# Patient Record
Sex: FEMALE | Race: WHITE
[De-identification: names, ages, dates, MRNs, and addresses within clinical notes are randomized per-mention and may not be internally consistent; named-entity substitution may affect disease eponyms.]

---

## 2018-05-10 ENCOUNTER — HOSPITAL ENCOUNTER (OUTPATIENT)
Dept: HOSPITAL 80 - FSGY | Age: 23
Discharge: HOME | End: 2018-05-10
Attending: PLASTIC SURGERY
Payer: COMMERCIAL

## 2018-05-10 VITALS — DIASTOLIC BLOOD PRESSURE: 77 MMHG | SYSTOLIC BLOOD PRESSURE: 121 MMHG

## 2018-05-10 DIAGNOSIS — M25.511: ICD-10-CM

## 2018-05-10 DIAGNOSIS — M54.9: ICD-10-CM

## 2018-05-10 DIAGNOSIS — M25.512: ICD-10-CM

## 2018-05-10 DIAGNOSIS — N62: Primary | ICD-10-CM

## 2018-05-10 DIAGNOSIS — M54.2: ICD-10-CM

## 2018-05-10 PROCEDURE — 0HBV0ZZ EXCISION OF BILATERAL BREAST, OPEN APPROACH: ICD-10-PCS | Performed by: PLASTIC SURGERY

## 2018-05-10 NOTE — POSTOPPROG
Post Op Note


Date of Operation: 05/10/18


Surgeon: Estevan Huang


Anesthesiologist: radha


Anesthesia: GET(General Endotracheal)


Pre-op Diagnosis: Breast Hypertrophy


Post-op Diagnosis: Same


Procedure: Bilateral breast reduction


Inf/Abcess present in the surg proc area at time of surgery?: No


EBL: 


Drains: Hemovac

## 2018-05-10 NOTE — GOP
[f 
rep st]



                                                                OPERATIVE REPORT





DATE OF OPERATION:  05/10/2018



SURGEON:  Estevan Huang MD



PREOPERATIVE DIAGNOSIS:  Breast hypertrophy.



POSTOPERATIVE DIAGNOSIS:  Breast hypertrophy.



PROCEDURE PERFORMED:  Bilateral breast reduction.



FINDINGS:  





ESTIMATED BLOOD LOSS:  25 mL.



DESCRIPTION OF PROCEDURE:  Patient lying supine under general anesthesia with 
endotracheal intubation.  The anterior chest region was prepped with Betadine 
and draped in the usual fashion.  Incisions were made according to preoperative 
markings for vertical pattern breast reduction with a superior/medial pedicle 
technique.  Pedicles were de-epithelialized and superior and lateral breast 
flaps were developed.  Excess tissue was excised resulting in removal of 556 g 
tissue on the right side, 686 g tissue on the left side.  Bleeders were 
controlled with electrocautery.  10 mm flat Ryley-Scott drains were placed 
bilaterally. Temporary staple closure was done and left breast medial 
inframammary skin excess was tailor tacked, marked and excised. Final closure 
was carried out with 3-0 Stratafix running subcuticular sutures and staples 
were removed.  Dressings of Steri-Strips and gauze were applied.  The procedure 
was tolerated well.





Job #:  [id number]

MTDD

## 2018-05-10 NOTE — GHP
[f rep st]



                                                       PREOP HISTORY AND PHYSICAL





DATE OF ADMISSION:  05/10/2018



CHIEF COMPLAINT:  Breast hypertrophy.



HISTORY OF PRESENTING COMPLAINT:  The patient is a 22-year-old female who has been bothered by excess
elie breast size since puberty.  She complains of neck, back, and shoulder pain and difficulty with ph
ysical activities.



PAST MEDICAL HISTORY:  Unremarkable.



MEDICATIONS:  She is on no medications.



ALLERGIES:  She has only seasonal allergies and no known drug allergies.



SOCIAL HISTORY:  She smokes occasional marijuana.  No tobacco.



PHYSICAL EXAMINATION:  GENERAL:  She is a pleasant healthy-looking, 22-year-old female.  She stands 5
 feet 6 inches tall and weighs 200 pounds.  CARDIOVASCULAR:  Heart sounds are normal.  RESPIRATORY:  
Chest is clear with good air entry.  BREASTS:  Examination of the breasts reveals significant macroma
stia with the left side being a little larger than the right side.  The sternal notch to the nipple d
istance is 32 cm on the right and 34 cm on the left.



IMPRESSION:  Fit for procedure.



PLAN:  Bilateral breast reduction.





Job #:  [id number]

## 2018-05-10 NOTE — PDANEPAE
ANE History of Present Illness





23 yo for reduction mammoplasty





ANE Past Medical History





- Cardiovascular History


Hx Hypertension: No


Hx Arrhythmias: No


Hx Chest Pain: No


Hx Coronary Artery / Peripheral Vascular Disease: No


Hx CHF / Valvular Disease: No


Hx Palpitations: No





- Pulmonary History


Hx COPD: No


Hx Asthma/Reactive Airway Disease: No


Hx Recent Upper Respiratory Infection: No


Hx Oxygen in Use at Home: No


Hx Sleep Apnea: No


Sleep Apnea Screening Result - Last Documented: Negative


Pulmonary History Comment: hx of asthma as child- hasn't used inhaler in 10 yrs





- Neurologic History


Hx Cerebrovascular Accident: No


Hx Seizures: No


Hx Dementia: No





- Endocrine History


Hx Diabetes: No





- Renal History


Hx Renal Disorders: No





- Liver History


Hx Hepatic Disorders: No





- Neurological & Psychiatric Hx


Hx Neurological and Psychiatric Disorders: No





- Cancer History


Hx Cancer: No





- Congenital Disorder History


Hx Congenital Disorders: No





- GI History


Hx Gastrointestinal Disorders: No





- Other Health History


Other Health History: wears glasses occassionally





- Chronic Pain History


Chronic Pain: No





- Surgical History


Prior Surgeries: na





ANE Review of Systems


Review of Systems: 








- Exercise capacity


METS (RN): 4 METS





ANE Patient History





- Allergies


Allergies/Adverse Reactions: 








No Known Allergies Allergy (Verified 05/10/18 10:14)


 








- Home Medications


Home Medications: 








NK [No Known Home Meds]  05/09/18 [Last Taken Unknown]








- NPO status


NPO Status: no food or drink >8 hours


NPO Since - Liquids (Date): 05/09/18


NPO Since - Liquids (Time): 20:00


NPO Since - Solids (Date): 05/09/18


NPO Since - Solids (Time): 20:00





- Anes Hx


Anes Hx: no prior problems





- Smoking Hx


Smoking Status: Never smoked





- Family Anes Hx


Family Hx Anesthesia Complications: none





ANE Labs/Vital Signs





- Vital Signs


Blood Pressure: 142/92


Heart Rate: 81


Respiratory Rate: 16


Height: 5 ft 6 in


Weight: 88.451 kg





ANE Physical Exam





- Airway


Neck exam: FROM


Mallampati Score: Class 2


Mouth exam: normal dental/mouth exam





- Pulmonary


Pulmonary: no respiratory distress





- Cardiovascular


Cardiovascular: regular rate and rhythym





- ASA Status


ASA Status: I





ANE Anesthesia Plan


Anesthesia Plan: general endotracheal anesthesia